# Patient Record
Sex: FEMALE | Employment: OTHER | URBAN - METROPOLITAN AREA
[De-identification: names, ages, dates, MRNs, and addresses within clinical notes are randomized per-mention and may not be internally consistent; named-entity substitution may affect disease eponyms.]

---

## 2021-12-09 ENCOUNTER — OFFICE VISIT (OUTPATIENT)
Dept: UROLOGY | Facility: CLINIC | Age: 61
End: 2021-12-09
Payer: OTHER GOVERNMENT

## 2021-12-09 VITALS
BODY MASS INDEX: 33.13 KG/M2 | WEIGHT: 180 LBS | SYSTOLIC BLOOD PRESSURE: 152 MMHG | HEART RATE: 84 BPM | DIASTOLIC BLOOD PRESSURE: 83 MMHG | HEIGHT: 62 IN

## 2021-12-09 DIAGNOSIS — R31.9 HEMATURIA, UNSPECIFIED TYPE: Primary | ICD-10-CM

## 2021-12-09 LAB — POC RESIDUAL URINE VOLUME: 0 ML (ref 0–100)

## 2021-12-09 PROCEDURE — 99204 OFFICE O/P NEW MOD 45 MIN: CPT | Mod: S$GLB,,, | Performed by: NURSE PRACTITIONER

## 2021-12-09 PROCEDURE — 51798 US URINE CAPACITY MEASURE: CPT | Mod: S$GLB,,, | Performed by: NURSE PRACTITIONER

## 2021-12-09 PROCEDURE — 99204 PR OFFICE/OUTPT VISIT, NEW, LEVL IV, 45-59 MIN: ICD-10-PCS | Mod: S$GLB,,, | Performed by: NURSE PRACTITIONER

## 2021-12-09 PROCEDURE — 51798 POCT BLADDER SCAN: ICD-10-PCS | Mod: S$GLB,,, | Performed by: NURSE PRACTITIONER

## 2021-12-09 RX ORDER — LISINOPRIL AND HYDROCHLOROTHIAZIDE 12.5; 2 MG/1; MG/1
1 TABLET ORAL DAILY
COMMUNITY

## 2021-12-09 RX ORDER — PNV NO.95/FERROUS FUM/FOLIC AC 28MG-0.8MG
1000 TABLET ORAL DAILY
COMMUNITY

## 2021-12-09 RX ORDER — FLUCONAZOLE 150 MG/1
150 TABLET ORAL DAILY
Qty: 2 TABLET | Refills: 0 | Status: SHIPPED | OUTPATIENT
Start: 2021-12-09 | End: 2021-12-11

## 2021-12-09 RX ORDER — MULTIVITAMIN
1 TABLET ORAL DAILY
COMMUNITY

## 2021-12-09 RX ORDER — LANOLIN ALCOHOL/MO/W.PET/CERES
1 CREAM (GRAM) TOPICAL 2 TIMES DAILY
COMMUNITY

## 2021-12-09 RX ORDER — OMEPRAZOLE 20 MG/1
20 CAPSULE, DELAYED RELEASE ORAL DAILY
COMMUNITY

## 2021-12-09 RX ORDER — NYSTATIN 100000 U/G
CREAM TOPICAL 2 TIMES DAILY
COMMUNITY

## 2021-12-12 LAB — URINE CULTURE, ROUTINE: NORMAL

## 2021-12-13 ENCOUNTER — TELEPHONE (OUTPATIENT)
Dept: UROLOGY | Facility: CLINIC | Age: 61
End: 2021-12-13
Payer: OTHER GOVERNMENT

## 2021-12-15 ENCOUNTER — TELEPHONE (OUTPATIENT)
Dept: UROLOGY | Facility: CLINIC | Age: 61
End: 2021-12-15
Payer: OTHER GOVERNMENT

## 2022-01-04 ENCOUNTER — PROCEDURE VISIT (OUTPATIENT)
Dept: UROLOGY | Facility: CLINIC | Age: 62
End: 2022-01-04
Payer: OTHER GOVERNMENT

## 2022-01-04 VITALS
DIASTOLIC BLOOD PRESSURE: 67 MMHG | RESPIRATION RATE: 18 BRPM | HEIGHT: 62 IN | SYSTOLIC BLOOD PRESSURE: 147 MMHG | HEART RATE: 81 BPM | BODY MASS INDEX: 34.14 KG/M2 | WEIGHT: 185.5 LBS

## 2022-01-04 DIAGNOSIS — D49.4 BLADDER TUMOR: ICD-10-CM

## 2022-01-04 DIAGNOSIS — R31.9 HEMATURIA, UNSPECIFIED TYPE: Primary | ICD-10-CM

## 2022-01-04 PROCEDURE — 52000 CYSTOURETHROSCOPY: CPT | Mod: S$GLB,,, | Performed by: UROLOGY

## 2022-01-04 PROCEDURE — 52000 CYSTOSCOPY: ICD-10-PCS | Mod: S$GLB,,, | Performed by: UROLOGY

## 2022-01-04 NOTE — PROGRESS NOTES
Pre-op instructions and surgery consent reviewed with pt. Pt verbalized understanding. Surgery consent signed by pt.

## 2022-01-04 NOTE — PATIENT INSTRUCTIONS
Patient Education       Cystoscopy   Why is this procedure done?   Your kidneys make urine. It is stored in your bladder. The urethra is a tube at the bottom of the bladder. Urine flows out of this tube. Sometimes, there is a blockage and urine is not able to leave the body.  A cystoscopy is a procedure that lets the doctor see the inside of your bladder and urethra. The doctor does it to:  · Look for stones or tumors blocking the bladder and urethra  · Look for changes or injury inside the bladder  · Take a tissue sample from the inside of your bladder  · Look for reasons for blood in the urine, pain with urination, or why you are passing urine often  · Look for prostate problems     What will the results be?   The results will help your doctor understand what kind of problem you have with your bladder or urethra. Together you can make a plan for more care.  What happens before the procedure?   · Your doctor will take your history. Talk to the doctor about:  ? All the drugs you are taking. Be sure to include all prescription and over-the-counter (OTC) drugs, and herbal supplements. Tell the doctor about any drug allergy. Bring a list of drugs you take with you.  ? Any bleeding problems. Be sure to tell your doctor if you are taking any drugs that may cause bleeding. Some of these are warfarin, rivaroxaban, apixaban, ticagrelor, clopidogrel, ketorolac, ibuprofen, naproxen, or aspirin. Certain vitamins and herbs, such as garlic and fish oil, may also add to the risk for bleeding. You may need to stop these drugs as well. Talk to your doctor about them.  ? If you need to stop eating or drinking before your procedure.  ? If you think you might have a urinary tract infection  · Your doctor will do an exam and may order:  ? Lab tests  ? Ultrasound  · You will empty your bladder about an hour before the procedure. Your doctor may need this urine sample for testing.  · You may not be allowed to drive right away after the  procedure. Ask a family member or a friend to drive you home.  What happens during the procedure?   · You will lie on your back on an exam table. Sometimes, you are given drugs to make you sleepy for this procedure. The doctor will put numbing drugs on the skin of your urethra.  · Your doctor will put a thin telescope called a cystoscope into your urethra. Your doctor will put water into your bladder through the tube. The doctor will fill your bladder wall until it expands. You may feel bloated or like you need to pass urine. Your doctor will look at the inside of your bladder. The doctor may take a small sample of tissue if it does not look normal. The tube will be taken out of your bladder.  · The procedure takes 5 to 20 minutes.  What happens after the procedure?   · The numbness will go away in 1 to 3 hours. If youve had sedation, you might be asked to remain in a recovery area until the effects of the drugs wear off.  · You may feel sore. You may see a small amount of blood in your urine for 1 to 2 days. It may also burn when you pass your urine or you may have to pass urine more often for 1 to 2 days.  · If the doctor did a biopsy, the sample will be sent for testing.  What drugs may be needed?   The doctor may order drugs to:  · Help with pain  · Fight an infection  · Prevent bladder spasms  What problems could happen?   · Bleeding  · Infection  · Injury to the bladder and urethra  · Discomfort in the urethra area  · Burning sensation for a short time  · Upset stomach  Where can I learn more?   [employer]  https://www.cancer.org/treatment/understanding-your-diagnosis/tests/endoscopy/cystoscopy.html   [employer]  https://www.cancerresearchuk.org/about-cancer/bladder-cancer/getting-diagnosed/tests-diagnose/cystoscopy   [facility] Choices  http://www.nhs.uk/conditions/Cystoscopy/Pages/Introduction.aspx   Last Reviewed Date   2021-04-22  Consumer Information Use and Disclaimer   This information is  not specific medical advice and does not replace information you receive from your health care provider. This is only a brief summary of general information. It does NOT include all information about conditions, illnesses, injuries, tests, procedures, treatments, therapies, discharge instructions or life-style choices that may apply to you. You must talk with your health care provider for complete information about your health and treatment options. This information should not be used to decide whether or not to accept your health care providers advice, instructions or recommendations. Only your health care provider has the knowledge and training to provide advice that is right for you.  Copyright   Copyright © 2021 [employer] and its affiliates and/or licensors. All rights reserved.

## 2022-01-04 NOTE — PROCEDURES
"Cystoscopy    Date/Time: 1/4/2022 10:30 AM  Performed by: Marcus Lyle MD  Authorized by: Marcus Lyle MD     Consent Done?:  Yes (Written)  Time out: Immediately prior to procedure a "time out" was called to verify the correct patient, procedure, equipment, support staff and site/side marked as required.    Indications: hematuria    Position:  Supine  Anesthesia:  Intraurethral instillation  Patient sedated?: No    Preparation: Patient was prepped and draped in usual sterile fashion      Scope type:  Flexible cystoscope  External exam normal: Yes    Urethra normal: Yes       The patient was brought to the procedure room placed on the table padded prepped and draped in usual sterile fashion in supine position. The cystoscope was inserted into the urethra and advanced the urethra was normal. 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  Pelvic exam was performed and no abnormalities were noted.    Impression:  Newly diagnosed what appears to be bladder tumor we will proceed with TURBT next available date patient will need paralysis as the lesion is located over the obturator nerve      "

## 2022-01-11 ENCOUNTER — TELEPHONE (OUTPATIENT)
Dept: UROLOGY | Facility: CLINIC | Age: 62
End: 2022-01-11
Payer: OTHER GOVERNMENT

## 2022-01-11 NOTE — TELEPHONE ENCOUNTER
----- Message from Aure Ribeiro sent at 1/11/2022 11:01 AM CST -----  Patient need to speak to nurse regarding her scheduled procedure and recovery time. Call back number [phone] (home)

## 2022-01-11 NOTE — TELEPHONE ENCOUNTER
Pt questioned if she will be able to move around next day after surgery like to go to a doc appt, informed that would be ok.

## 2022-01-19 ENCOUNTER — OUTSIDE PLACE OF SERVICE (OUTPATIENT)
Dept: UROLOGY | Facility: CLINIC | Age: 62
End: 2022-01-19

## 2022-01-19 PROCEDURE — 52235 CYSTOSCOPY AND TREATMENT: CPT | Mod: ,,, | Performed by: UROLOGY

## 2022-01-19 PROCEDURE — 52235 PR CYSTOURETHROSCOPY,FULGUR 2-5 CM LESN: ICD-10-PCS | Mod: ,,, | Performed by: UROLOGY

## 2022-01-21 ENCOUNTER — TELEPHONE (OUTPATIENT)
Dept: UROLOGY | Facility: CLINIC | Age: 62
End: 2022-01-21
Payer: OTHER GOVERNMENT

## 2022-01-21 NOTE — TELEPHONE ENCOUNTER
----- Message from Tiffanie Lozoya sent at 1/21/2022 12:45 PM CST -----  Regarding: pt advice  Contact: Pt  Pt states that her catheter is leaking. Please call back at [phone]//thank you acc

## 2022-01-21 NOTE — TELEPHONE ENCOUNTER
Pt stated sometimes her bladder gets really full and she leaks out the catheter. I informed her that was ok and that her body might just try to override the catheter sometiems. She was educated that is the catheter stops draining into bag like normal, to report to ER. She stated it is functioning properly as of now. She wanted her appt moved up a day so we did that. HMlpn

## 2022-02-08 ENCOUNTER — OFFICE VISIT (OUTPATIENT)
Dept: UROLOGY | Facility: CLINIC | Age: 62
End: 2022-02-08
Payer: OTHER GOVERNMENT

## 2022-02-08 VITALS — SYSTOLIC BLOOD PRESSURE: 173 MMHG | DIASTOLIC BLOOD PRESSURE: 75 MMHG | HEART RATE: 92 BPM | RESPIRATION RATE: 17 BRPM

## 2022-02-08 DIAGNOSIS — D30.3: Primary | ICD-10-CM

## 2022-02-08 PROCEDURE — 99024 PR POST-OP FOLLOW-UP VISIT: ICD-10-PCS | Mod: S$GLB,,, | Performed by: UROLOGY

## 2022-02-08 PROCEDURE — 99024 POSTOP FOLLOW-UP VISIT: CPT | Mod: S$GLB,,, | Performed by: UROLOGY

## 2022-02-08 NOTE — PROGRESS NOTES
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  Return to clinic as needed